# Patient Record
(demographics unavailable — no encounter records)

---

## 2024-12-19 NOTE — HISTORY OF PRESENT ILLNESS
[FreeTextEntry1] : Leonora Mcdonald 27 YO, presents for Annual & Irregular menses.  G 0    LMP: 12/01/24 - Irregular menses Last 3 months- Spots for 1 week prior to menses & then menses for 5-7 days.  Not sexually active No Medication No family history of breast cancer  To do monthly SBE. Has history of PCOS and sees an endocrinologist, Dr. KATHLEEN Kent.  No other health issues, nonsmoker.  HIV counseling done.  For pelvic sonogram with Rachel. To call for results.

## 2024-12-19 NOTE — PHYSICAL EXAM
[Chaperone Present] : A chaperone was present in the examining room during all aspects of the physical examination [22490] : A chaperone was present during the pelvic exam. [Soft] : soft [Non-tender] : non-tender [Examination Of The Breasts] : a normal appearance [No Masses] : no breast masses were palpable [Labia Majora] : normal [Labia Minora] : normal [Normal] : normal [Uterine Adnexae] : normal